# Patient Record
Sex: MALE | Race: WHITE | Employment: PART TIME | URBAN - NONMETROPOLITAN AREA
[De-identification: names, ages, dates, MRNs, and addresses within clinical notes are randomized per-mention and may not be internally consistent; named-entity substitution may affect disease eponyms.]

---

## 2018-01-23 DIAGNOSIS — M25.522 LEFT ELBOW PAIN: Primary | ICD-10-CM

## 2018-01-24 ENCOUNTER — OFFICE VISIT (OUTPATIENT)
Dept: ORTHOPEDIC SURGERY | Age: 43
End: 2018-01-24
Payer: COMMERCIAL

## 2018-01-24 ENCOUNTER — HOSPITAL ENCOUNTER (OUTPATIENT)
Dept: GENERAL RADIOLOGY | Age: 43
Discharge: HOME | End: 2018-01-24
Payer: COMMERCIAL

## 2018-01-24 VITALS
HEIGHT: 70 IN | BODY MASS INDEX: 33.07 KG/M2 | DIASTOLIC BLOOD PRESSURE: 84 MMHG | SYSTOLIC BLOOD PRESSURE: 116 MMHG | WEIGHT: 231 LBS | HEART RATE: 64 BPM

## 2018-01-24 DIAGNOSIS — M25.522 LEFT ELBOW PAIN: Primary | ICD-10-CM

## 2018-01-24 DIAGNOSIS — M25.522 LEFT ELBOW PAIN: ICD-10-CM

## 2018-01-24 PROCEDURE — 73080 X-RAY EXAM OF ELBOW: CPT

## 2018-01-24 PROCEDURE — 99203 OFFICE O/P NEW LOW 30 MIN: CPT | Performed by: FAMILY MEDICINE

## 2018-01-24 RX ORDER — HYDROCODONE BITARTRATE AND ACETAMINOPHEN 5; 325 MG/1; MG/1
1 TABLET ORAL PRN
COMMUNITY

## 2018-01-24 RX ORDER — OMEPRAZOLE 20 MG/1
20 CAPSULE, DELAYED RELEASE ORAL 2 TIMES DAILY
COMMUNITY

## 2018-01-24 NOTE — PROGRESS NOTES
breath; swelling of feet, ankles; and loss of consciousness. CON: Denies fever and dizziness. ENT:  Denies hearing loss / ringing, ear infections hoarseness, and swallowing problems. RESP:  Denies chronic cough, spitting up blood, and asthma/wheezing. GI: Denies abdominal pain, change in bowel habits, nausea or vomiting, and blood in stools. :  Denies frequent urination, burning or painful urination, blood in the urine, and bladder incontinence. NEURO:  Denies headache, memory loss, sleep disturbance, and tremor or movement disorder. PHYSICAL EXAM:   /84   Pulse 64   Ht 5' 10\" (1.778 m)   Wt 231 lb (104.8 kg)   BMI 33.15 kg/m²   GENERAL: Brandon Bhagat is a 38 y.o. male who is alert and oriented and sitting comfortably in our office. SKIN:  Intact without rashes, lesions or ulcerations. NEURO: Sensation to the extremity is intact. VASC:  Capillary refill is less than 3 seconds. Distal pulses are palpable. There is no lymphadenopathy. Elbow Exam:  Musculoskeletal/Neurologic:  Inspection-Deformity: no  Palpation-Tenderness: Distal biceps tendon and ulnar collateral ligament  Elbow ROM-WNL  Pain with passive wrist flexion: no  Pain with passive wrist extension: yes  Pain with active wrist flexion: no  Pain with active wrist extension: yes  Strength- WNL  Sensation-normal to light touch in median, ulnar, and radial distribution  Special Tests-No varus/valgus laxity  Milking Maneuver negative  Cintron-Haw negative    Tinel's at cubital tunnel:negative    Sensation-normal to light touch    PSYCH:  Good fund of knowledge and displays understanding of exam.    RADIOLOGY: No results found. Radiology:  3 views of the Left elbow were ordered, independently visualized by me, and discussed with patient. Findings: Left elbow radiographs are unremarkable    IMPRESSION:     1. Left elbow pain          PLAN:   We discussed some of the etiologies and natural histories of     ICD-10-CM ICD-9-CM    1.

## 2019-03-07 ENCOUNTER — HOSPITAL ENCOUNTER (OUTPATIENT)
Age: 44
Setting detail: SPECIMEN
Discharge: HOME | End: 2019-03-07
Payer: OTHER GOVERNMENT

## 2019-03-07 LAB
VALPROIC ACID LEVEL: 33 UG/ML (ref 50–125)
VALPROIC DATE LAST DOSE: ABNORMAL
VALPROIC DOSE AMOUNT: ABNORMAL
VALPROIC TIME LAST DOSE: 2130